# Patient Record
Sex: MALE | Race: WHITE | NOT HISPANIC OR LATINO
[De-identification: names, ages, dates, MRNs, and addresses within clinical notes are randomized per-mention and may not be internally consistent; named-entity substitution may affect disease eponyms.]

---

## 2018-01-02 PROBLEM — Z00.00 ENCOUNTER FOR PREVENTIVE HEALTH EXAMINATION: Status: ACTIVE | Noted: 2018-01-02

## 2018-01-04 ENCOUNTER — APPOINTMENT (OUTPATIENT)
Dept: ORTHOPEDIC SURGERY | Facility: CLINIC | Age: 23
End: 2018-01-04

## 2018-02-28 ENCOUNTER — APPOINTMENT (OUTPATIENT)
Dept: ORTHOPEDIC SURGERY | Facility: CLINIC | Age: 23
End: 2018-02-28
Payer: MEDICAID

## 2018-02-28 VITALS
SYSTOLIC BLOOD PRESSURE: 127 MMHG | BODY MASS INDEX: 21.47 KG/M2 | HEART RATE: 73 BPM | HEIGHT: 70 IN | WEIGHT: 150 LBS | DIASTOLIC BLOOD PRESSURE: 75 MMHG

## 2018-02-28 DIAGNOSIS — Z78.9 OTHER SPECIFIED HEALTH STATUS: ICD-10-CM

## 2018-02-28 DIAGNOSIS — G56.80 OTHER SPECIFIED MONONEUROPATHIES OF UNSPECIFIED UPPER LIMB: ICD-10-CM

## 2018-02-28 DIAGNOSIS — M25.852 OTHER SPECIFIED JOINT DISORDERS, LEFT HIP: ICD-10-CM

## 2018-02-28 DIAGNOSIS — M25.851 OTHER SPECIFIED JOINT DISORDERS, RIGHT HIP: ICD-10-CM

## 2018-02-28 PROCEDURE — 72100 X-RAY EXAM L-S SPINE 2/3 VWS: CPT

## 2018-02-28 PROCEDURE — 72170 X-RAY EXAM OF PELVIS: CPT

## 2018-02-28 PROCEDURE — 99203 OFFICE O/P NEW LOW 30 MIN: CPT

## 2019-05-01 ENCOUNTER — APPOINTMENT (OUTPATIENT)
Dept: ORTHOPEDIC SURGERY | Facility: CLINIC | Age: 24
End: 2019-05-01
Payer: MEDICAID

## 2019-05-01 VITALS
HEART RATE: 74 BPM | BODY MASS INDEX: 21.62 KG/M2 | WEIGHT: 151 LBS | SYSTOLIC BLOOD PRESSURE: 142 MMHG | DIASTOLIC BLOOD PRESSURE: 79 MMHG | HEIGHT: 70 IN

## 2019-05-01 PROCEDURE — 99214 OFFICE O/P EST MOD 30 MIN: CPT

## 2019-05-01 RX ORDER — DICLOFENAC SODIUM 75 MG/1
75 TABLET, DELAYED RELEASE ORAL
Qty: 30 | Refills: 1 | Status: ACTIVE | COMMUNITY
Start: 2019-05-01 | End: 1900-01-01

## 2019-05-01 RX ORDER — MELOXICAM 15 MG/1
15 TABLET ORAL
Qty: 60 | Refills: 2 | Status: DISCONTINUED | COMMUNITY
Start: 2018-02-28 | End: 2019-05-01

## 2019-05-01 NOTE — PHYSICAL EXAM
[de-identified] : Negative straight leg raise on the left\par Neurologically intact distally and symmetrical\par otherwise, 5 out of 5 motor strength, sensation is intact and symmetrical full range of motion flexion extension and rotation, no palpatory tenderness full range of motion of hips knees shoulders and elbows (all four extremities), no atrophy, negative straight leg raise, no pathological reflexes, no swelling, normal ambulation, no apparent distress skin is intact, no palpable lymph nodes, no upper or lower extremity instability, alert and oriented x3 and normal mood. Normal finger-to nose test.  [de-identified] : 2 view lumbar spine February 28 by Dr. Sinan Benites, were negative for any obvious fracture, or instability. Adequate lordosis noted

## 2019-05-01 NOTE — HISTORY OF PRESENT ILLNESS
[de-identified] : Patient is a pleasant, 22-year-old male comes in complaining of left radiating leg pain, posterior thigh into the posterior lateral calf for the past few months. Patient states meloxicam did help, he got better and then recently, 3-4 weeks ago, started getting left radiating leg pain. Denies right leg pain.  Patient states that he is still going to the gym, but has limited the exercises.\par Patient feels as though he is getting worse.\par His mother is in the exam room with him\par No fever chills sweats nausea vomiting no bowel or bladder dysfunction\par no recent weight loss or gain no night pain. \par This history is in addition to the intake form that I personally reviewed.

## 2019-05-01 NOTE — DISCUSSION/SUMMARY
[de-identified] : Left lumbar radiculopathy\par Discontinue meloxicam and start diclofenac 2 tablets daily for 10 days and then as needed\par Physical therapy prescription written\par MRI prescription written to rule out a herniated disc with nerve compression on the left\par A followup with Dr. Dale dennis or not a spine specialist in 2 weeks\par Options discussed.\par All questions answered.\par The patient and his mother are in full agreement with the plan, \par and happy with the visit\par

## 2019-05-14 ENCOUNTER — OUTPATIENT (OUTPATIENT)
Dept: OUTPATIENT SERVICES | Facility: HOSPITAL | Age: 24
LOS: 1 days | End: 2019-05-14
Payer: MEDICAID

## 2019-05-14 ENCOUNTER — APPOINTMENT (OUTPATIENT)
Dept: MRI IMAGING | Facility: CLINIC | Age: 24
End: 2019-05-14
Payer: MEDICAID

## 2019-05-14 DIAGNOSIS — Z00.8 ENCOUNTER FOR OTHER GENERAL EXAMINATION: ICD-10-CM

## 2019-05-14 PROCEDURE — 72148 MRI LUMBAR SPINE W/O DYE: CPT | Mod: 26

## 2019-05-14 PROCEDURE — 72148 MRI LUMBAR SPINE W/O DYE: CPT

## 2019-05-15 ENCOUNTER — APPOINTMENT (OUTPATIENT)
Dept: ORTHOPEDIC SURGERY | Facility: CLINIC | Age: 24
End: 2019-05-15
Payer: MEDICAID

## 2019-05-15 DIAGNOSIS — M51.26 OTHER INTERVERTEBRAL DISC DISPLACEMENT, LUMBAR REGION: ICD-10-CM

## 2019-05-15 PROCEDURE — 99214 OFFICE O/P EST MOD 30 MIN: CPT

## 2020-02-21 ENCOUNTER — TRANSCRIPTION ENCOUNTER (OUTPATIENT)
Age: 25
End: 2020-02-21

## 2020-10-31 DIAGNOSIS — Z87.09 PERSONAL HISTORY OF OTHER DISEASES OF THE RESPIRATORY SYSTEM: ICD-10-CM

## 2020-10-31 DIAGNOSIS — E78.5 HYPERLIPIDEMIA, UNSPECIFIED: ICD-10-CM

## 2020-10-31 DIAGNOSIS — Z83.438 FAMILY HISTORY OF OTHER DISORDER OF LIPOPROTEIN METABOLISM AND OTHER LIPIDEMIA: ICD-10-CM

## 2020-10-31 DIAGNOSIS — Z80.3 FAMILY HISTORY OF MALIGNANT NEOPLASM OF BREAST: ICD-10-CM

## 2020-10-31 DIAGNOSIS — M79.606 PAIN IN LEG, UNSPECIFIED: ICD-10-CM

## 2020-10-31 DIAGNOSIS — Z78.9 OTHER SPECIFIED HEALTH STATUS: ICD-10-CM

## 2020-10-31 DIAGNOSIS — Z87.898 PERSONAL HISTORY OF OTHER SPECIFIED CONDITIONS: ICD-10-CM

## 2020-12-23 PROBLEM — Z87.09 HISTORY OF SORE THROAT: Status: RESOLVED | Noted: 2020-10-31 | Resolved: 2020-12-23

## 2021-07-06 ENCOUNTER — NON-APPOINTMENT (OUTPATIENT)
Age: 26
End: 2021-07-06

## 2021-07-10 ENCOUNTER — TRANSCRIPTION ENCOUNTER (OUTPATIENT)
Age: 26
End: 2021-07-10

## 2021-07-10 ENCOUNTER — APPOINTMENT (OUTPATIENT)
Dept: ORTHOPEDIC SURGERY | Facility: CLINIC | Age: 26
End: 2021-07-10
Payer: COMMERCIAL

## 2021-07-10 VITALS
SYSTOLIC BLOOD PRESSURE: 130 MMHG | BODY MASS INDEX: 23.62 KG/M2 | WEIGHT: 165 LBS | HEART RATE: 80 BPM | DIASTOLIC BLOOD PRESSURE: 78 MMHG | HEIGHT: 70 IN

## 2021-07-10 DIAGNOSIS — G89.29 LUMBAGO WITH SCIATICA, LEFT SIDE: ICD-10-CM

## 2021-07-10 DIAGNOSIS — M54.16 RADICULOPATHY, LUMBAR REGION: ICD-10-CM

## 2021-07-10 DIAGNOSIS — M54.42 LUMBAGO WITH SCIATICA, LEFT SIDE: ICD-10-CM

## 2021-07-10 DIAGNOSIS — M54.41 LUMBAGO WITH SCIATICA, LEFT SIDE: ICD-10-CM

## 2021-07-10 PROCEDURE — 99214 OFFICE O/P EST MOD 30 MIN: CPT

## 2021-07-10 PROCEDURE — 72100 X-RAY EXAM L-S SPINE 2/3 VWS: CPT

## 2021-07-10 RX ORDER — DICLOFENAC SODIUM 75 MG/1
75 TABLET, DELAYED RELEASE ORAL TWICE DAILY
Qty: 60 | Refills: 0 | Status: ACTIVE | COMMUNITY
Start: 2021-07-10 | End: 1900-01-01

## 2021-07-10 NOTE — HISTORY OF PRESENT ILLNESS
[de-identified] : Mr. FARIHA STARK is a 25 year male who presents to office complaining of lower back pain.\par This began about 1 month ago with insidious onset. He stopped dead lifting a while before that, but had been active with this previously, along with working out at the gym.\par He also mentions recently moving and lifting a lot of boxes at that time, which may have contributed to this as per patient.\par Describes a constant radiating pain from his left buttocks to his left foot along with numbness in his left foot.\par He has tried Naproxen, Medrol Dose Pack, and Flexeril as per [facility] urgent care last week, which haven't helped much.\par He was seen in 2019 for an L5-S1 herniated disc, successfully treated with Naproxen and PT.\par MRI Lumbar Spine DOS 5/14/19 IMPRESSION: Central left-sided disc herniation is seen at L5-S1 level which does extend into the left lateral recess of S1 causing mass effect on the left S1 nerve root. \par All review of systems, family history, social history, surgical history, past medical history, medications, and allergies not previously stated as positive are negative. They were reviewed by me today with the patient and documented accordingly.

## 2021-07-10 NOTE — DISCUSSION/SUMMARY
[de-identified] : Diclofenac 75 mg prescribed\par Physical Therapy ordered\par Follow up with Dr. Kohli in 4-6 weeks if pain persists\par Consider MRI lumbar spine at that time\par All options discussed with patient and his mother\par All questions by them were answered to their satisfaction\par They express full understanding and agreement with plan\par They are both very happy with the office visit\par

## 2021-07-10 NOTE — PHYSICAL EXAM
[de-identified] : Lower Back: No obvious deformities, atrophy, ecchymosis, or swelling/effusion. Negative midline and paraspinal muscle tenderness. Normal active and passive range of motion, including flexion to 90 degrees, extension to 60 degrees, lateral rotation to 90 degrees, and lateral bending to 75 degrees. Normal hip flexion (L2), knee extension (L3), knee flexion (L4), ankle dorsiflexion (L4), ankle inversion/eversion (L5), and ankle plantarflexion (S1). Positive straight leg raise. Negative Garcia's, Clonus, and Babinski tests. 5/5 muscle strength. Neurovascular status is intact.\par \par Otherwise, no apparent distress. Alert and oriented x 3. Normal mood and affect. No atrophy or swelling. 5 out of 5 motor strength. Sensation is intact and symmetrical. Normal deep tendon reflexes. Full range of motion of shoulder, elbows, hips, and knees (flexion, extension, and rotation). No palpatory tenderness or palpable lymph nodes. Negative straight leg raise. Normal finger-to-nose test. No pathological reflexes. Normal ambulation. No upper or lower extremity instability. [de-identified] : 2 views of lumbar spine are negative for any obvious fractures or dislocations. Straightening of the normal lumbar lordosis is noted. The patient understands that this is a wet read and I am not a radiologist. If the final read reveals something different than discussed in the office, then the patient will get a call back in the next 24 - 48 hours to discuss.\par

## 2021-07-15 PROBLEM — M54.42 CHRONIC BILATERAL LOW BACK PAIN WITH BILATERAL SCIATICA: Status: ACTIVE | Noted: 2018-02-28

## 2021-07-30 ENCOUNTER — RX RENEWAL (OUTPATIENT)
Age: 26
End: 2021-07-30

## 2022-07-01 ENCOUNTER — NON-APPOINTMENT (OUTPATIENT)
Age: 27
End: 2022-07-01

## 2023-02-27 ENCOUNTER — NON-APPOINTMENT (OUTPATIENT)
Age: 28
End: 2023-02-27

## 2023-03-06 ENCOUNTER — APPOINTMENT (OUTPATIENT)
Dept: ORTHOPEDIC SURGERY | Facility: CLINIC | Age: 28
End: 2023-03-06

## 2023-07-22 ENCOUNTER — NON-APPOINTMENT (OUTPATIENT)
Age: 28
End: 2023-07-22

## 2023-10-28 ENCOUNTER — NON-APPOINTMENT (OUTPATIENT)
Age: 28
End: 2023-10-28

## 2024-12-09 ENCOUNTER — NON-APPOINTMENT (OUTPATIENT)
Age: 29
End: 2024-12-09